# Patient Record
Sex: FEMALE | Race: WHITE | NOT HISPANIC OR LATINO | Employment: FULL TIME | URBAN - METROPOLITAN AREA
[De-identification: names, ages, dates, MRNs, and addresses within clinical notes are randomized per-mention and may not be internally consistent; named-entity substitution may affect disease eponyms.]

---

## 2023-05-04 ENCOUNTER — TELEPHONE (OUTPATIENT)
Dept: PRIMARY CARE | Facility: CLINIC | Age: 33
End: 2023-05-04

## 2023-05-04 ENCOUNTER — OFFICE VISIT (OUTPATIENT)
Dept: PRIMARY CARE | Facility: CLINIC | Age: 33
End: 2023-05-04
Payer: COMMERCIAL

## 2023-05-04 ENCOUNTER — LAB (OUTPATIENT)
Dept: LAB | Facility: LAB | Age: 33
End: 2023-05-04
Payer: COMMERCIAL

## 2023-05-04 VITALS
BODY MASS INDEX: 34.43 KG/M2 | HEART RATE: 99 BPM | OXYGEN SATURATION: 97 % | HEIGHT: 68 IN | SYSTOLIC BLOOD PRESSURE: 125 MMHG | WEIGHT: 227.2 LBS | DIASTOLIC BLOOD PRESSURE: 87 MMHG

## 2023-05-04 DIAGNOSIS — R10.11 RIGHT UPPER QUADRANT ABDOMINAL PAIN: Primary | ICD-10-CM

## 2023-05-04 DIAGNOSIS — R10.11 RIGHT UPPER QUADRANT ABDOMINAL PAIN: ICD-10-CM

## 2023-05-04 DIAGNOSIS — R11.2 NAUSEA AND VOMITING, UNSPECIFIED VOMITING TYPE: Primary | ICD-10-CM

## 2023-05-04 DIAGNOSIS — Z00.00 ANNUAL PHYSICAL EXAM: ICD-10-CM

## 2023-05-04 DIAGNOSIS — R11.2 NAUSEA AND VOMITING, UNSPECIFIED VOMITING TYPE: ICD-10-CM

## 2023-05-04 PROCEDURE — 99213 OFFICE O/P EST LOW 20 MIN: CPT | Performed by: INTERNAL MEDICINE

## 2023-05-04 PROCEDURE — 84443 ASSAY THYROID STIM HORMONE: CPT

## 2023-05-04 PROCEDURE — 83690 ASSAY OF LIPASE: CPT

## 2023-05-04 PROCEDURE — 82150 ASSAY OF AMYLASE: CPT

## 2023-05-04 PROCEDURE — 80053 COMPREHEN METABOLIC PANEL: CPT

## 2023-05-04 PROCEDURE — 36415 COLL VENOUS BLD VENIPUNCTURE: CPT

## 2023-05-04 PROCEDURE — 85027 COMPLETE CBC AUTOMATED: CPT

## 2023-05-04 PROCEDURE — 82652 VIT D 1 25-DIHYDROXY: CPT

## 2023-05-04 PROCEDURE — 80061 LIPID PANEL: CPT

## 2023-05-04 RX ORDER — DICYCLOMINE HYDROCHLORIDE 10 MG/1
10 CAPSULE ORAL 4 TIMES DAILY PRN
Qty: 30 CAPSULE | Refills: 0 | Status: SHIPPED | OUTPATIENT
Start: 2023-05-04 | End: 2023-07-05 | Stop reason: SDUPTHER

## 2023-05-04 ASSESSMENT — ENCOUNTER SYMPTOMS
MYALGIAS: 0
ABDOMINAL PAIN: 1
PALPITATIONS: 0
NAUSEA: 1
LIGHT-HEADEDNESS: 0
CONSTIPATION: 0
HEADACHES: 0
FATIGUE: 0
VOMITING: 1
DYSURIA: 0
FEVER: 0
DIFFICULTY URINATING: 0
DIARRHEA: 0
ARTHRALGIAS: 0
COUGH: 0
WEAKNESS: 0
SORE THROAT: 0
SHORTNESS OF BREATH: 0
HEMATURIA: 0
CHILLS: 1
FREQUENCY: 0
DIZZINESS: 0

## 2023-05-04 ASSESSMENT — PAIN SCALES - GENERAL: PAINLEVEL: 4

## 2023-05-04 ASSESSMENT — PATIENT HEALTH QUESTIONNAIRE - PHQ9
2. FEELING DOWN, DEPRESSED OR HOPELESS: NOT AT ALL
SUM OF ALL RESPONSES TO PHQ9 QUESTIONS 1 AND 2: 0
1. LITTLE INTEREST OR PLEASURE IN DOING THINGS: NOT AT ALL

## 2023-05-04 NOTE — TELEPHONE ENCOUNTER
Patient states her galbladder scan is for May 19th in [place]    Patient states she is in so much pain and she don't think she can make it until then. She want to know if the order can be place as stat and she also want to know if she can have something for nausea because she said when she eat she vomit a hr or 2 afterwards.     I spoke with Cesar and her Bentyl will be ready in 30 mins  BN

## 2023-05-04 NOTE — LETTER
May 4, 2023     Patient: Karina Sanchez   YOB: 1990   Date of Visit: 5/4/2023       To Whom It May Concern:    Karina Sanchez was seen in my clinic on 5/4/2023 at 10:00 am. Please excuse Karina for her absence from work on this day to make the appointment.    If you have any questions or concerns, please don't hesitate to call.         Sincerely,         Lori Abdi MD        CC: No Recipients

## 2023-05-04 NOTE — TELEPHONE ENCOUNTER
I called and spoke to the patient.  She is clearly in some distress and worried about not being able to eat or drink.  At this time I encouraged her to go on a liquid diet and push things like ginger ale and Gatorade and broth tonight.  If she is doing well then tomorrow she could have a dry piece of toast with breakfast and to slowly increase her diet from there.  I encouraged her to take the Bentyl that was called in and to use Prilosec 20 mg daily over-the-counter.  Patient will try these measures was reassured.  If symptoms are consistently causing her abdominal bloating bowel nausea and vomiting I would next check a CT of the abdomen to make sure she does not have a bowel obstruction.  This seems unlikely but is always possible.  She is still having normal bowel movements and had no lower abdominal pain to palpation today and normal bowel sounds when I examined her.

## 2023-05-04 NOTE — PROGRESS NOTES
Subjective   Patient ID: Karina Sanchez is a 32 y.o. female who presents for stomach pain above her navel and have back pain. She also states she been vomiting. This been going on for the past two days.  BN    Patient is here to establish with new primary care physician she has not seen anybody with her OB/GYN in several years.  Patient is here because on this past Monday night she started getting severe stomach and/or abdominal pains.  Began on Monday evening and extended into Tuesday morning with severe right upper quadrant abdominal pain nausea and vomiting.  By Tuesday night the pain was severe again in her extending into the right back just below the scapula on the right side.  She also had chills but no fever and symptoms are directly correlated to when she eats.  When she eats the symptoms get much worse and that if she stops eating they seem to slowly improve.        Review of Systems   Constitutional:  Positive for chills. Negative for fatigue and fever.   HENT:  Negative for sore throat.    Eyes:  Negative for visual disturbance.   Respiratory:  Negative for cough and shortness of breath.    Cardiovascular:  Negative for chest pain, palpitations and leg swelling.   Gastrointestinal:  Positive for abdominal pain, nausea and vomiting. Negative for constipation and diarrhea.   Genitourinary:  Negative for difficulty urinating, dysuria, frequency, hematuria and urgency.   Musculoskeletal:  Negative for arthralgias and myalgias.   Skin:  Negative for rash.   Neurological:  Negative for dizziness, syncope, weakness, light-headedness and headaches.       Objective   Medication Documentation Review Audit       Reviewed by Lori Abdi MD (Physician) on 05/04/23 at 1018      Medication Order Taking? Sig Documenting Provider Last Dose Status            No Medications to Display                                 Physical Exam  Constitutional:       Appearance: Normal appearance.   HENT:      Head: Normocephalic and  "atraumatic.      Nose: Nose normal.   Eyes:      Extraocular Movements: Extraocular movements intact.      Pupils: Pupils are equal, round, and reactive to light.   Cardiovascular:      Rate and Rhythm: Normal rate and regular rhythm.   Pulmonary:      Breath sounds: Normal breath sounds.   Abdominal:      General: Abdomen is flat. Bowel sounds are normal.      Palpations: Abdomen is soft.      Tenderness: There is abdominal tenderness. There is guarding.   Musculoskeletal:      Right lower leg: No edema.      Left lower leg: No edema.   Neurological:      Mental Status: She is alert.     /87 (BP Location: Left arm, Patient Position: Sitting)   Pulse 99   Ht 1.727 m (5' 8\")   Wt 103 kg (227 lb 3.2 oz)   SpO2 97%   BMI 34.55 kg/m²       Assessment/Plan   Problem List Items Addressed This Visit          Nervous    Right upper quadrant abdominal pain - Primary    Relevant Orders    US gallbladder     Other Visit Diagnoses       Annual physical exam        Relevant Orders    Lipid Panel    CBC    Comprehensive Metabolic Panel    TSH with reflex to Free T4 if abnormal    Vitamin D 1,25 Dihydroxy                   It has been a pleasure seeing you.   "

## 2023-05-05 LAB
ALANINE AMINOTRANSFERASE (SGPT) (U/L) IN SER/PLAS: 28 U/L (ref 7–45)
ALBUMIN (G/DL) IN SER/PLAS: 4.8 G/DL (ref 3.4–5)
ALKALINE PHOSPHATASE (U/L) IN SER/PLAS: 44 U/L (ref 33–110)
AMYLASE (U/L) IN SER/PLAS: 30 U/L (ref 29–103)
ANION GAP IN SER/PLAS: 15 MMOL/L (ref 10–20)
ASPARTATE AMINOTRANSFERASE (SGOT) (U/L) IN SER/PLAS: 18 U/L (ref 9–39)
BILIRUBIN TOTAL (MG/DL) IN SER/PLAS: 0.5 MG/DL (ref 0–1.2)
CALCIUM (MG/DL) IN SER/PLAS: 10 MG/DL (ref 8.6–10.6)
CARBON DIOXIDE, TOTAL (MMOL/L) IN SER/PLAS: 26 MMOL/L (ref 21–32)
CHLORIDE (MMOL/L) IN SER/PLAS: 103 MMOL/L (ref 98–107)
CHOLESTEROL (MG/DL) IN SER/PLAS: 222 MG/DL (ref 0–199)
CHOLESTEROL IN HDL (MG/DL) IN SER/PLAS: 37.6 MG/DL
CHOLESTEROL/HDL RATIO: 5.9
CREATININE (MG/DL) IN SER/PLAS: 0.94 MG/DL (ref 0.5–1.05)
ERYTHROCYTE DISTRIBUTION WIDTH (RATIO) BY AUTOMATED COUNT: 12.6 % (ref 11.5–14.5)
ERYTHROCYTE MEAN CORPUSCULAR HEMOGLOBIN CONCENTRATION (G/DL) BY AUTOMATED: 34 G/DL (ref 32–36)
ERYTHROCYTE MEAN CORPUSCULAR VOLUME (FL) BY AUTOMATED COUNT: 93 FL (ref 80–100)
ERYTHROCYTES (10*6/UL) IN BLOOD BY AUTOMATED COUNT: 4.89 X10E12/L (ref 4–5.2)
GFR FEMALE: 82 ML/MIN/1.73M2
GLUCOSE (MG/DL) IN SER/PLAS: 104 MG/DL (ref 74–99)
HEMATOCRIT (%) IN BLOOD BY AUTOMATED COUNT: 45.3 % (ref 36–46)
HEMOGLOBIN (G/DL) IN BLOOD: 15.4 G/DL (ref 12–16)
LDL: 133 MG/DL (ref 0–99)
LEUKOCYTES (10*3/UL) IN BLOOD BY AUTOMATED COUNT: 9.4 X10E9/L (ref 4.4–11.3)
LIPASE (U/L) IN SER/PLAS: 16 U/L (ref 9–82)
NON HDL CHOLESTEROL: 184 MG/DL
NRBC (PER 100 WBCS) BY AUTOMATED COUNT: 0 /100 WBC (ref 0–0)
PLATELETS (10*3/UL) IN BLOOD AUTOMATED COUNT: 246 X10E9/L (ref 150–450)
POTASSIUM (MMOL/L) IN SER/PLAS: 3.8 MMOL/L (ref 3.5–5.3)
PROTEIN TOTAL: 7.4 G/DL (ref 6.4–8.2)
SODIUM (MMOL/L) IN SER/PLAS: 140 MMOL/L (ref 136–145)
THYROTROPIN (MIU/L) IN SER/PLAS BY DETECTION LIMIT <= 0.05 MIU/L: 1.19 MIU/L (ref 0.44–3.98)
TRIGLYCERIDE (MG/DL) IN SER/PLAS: 257 MG/DL (ref 0–149)
UREA NITROGEN (MG/DL) IN SER/PLAS: 11 MG/DL (ref 6–23)
VLDL: 51 MG/DL (ref 0–40)

## 2023-05-08 LAB — VITAMIN D 1,25-DIHYDROXY: 53 PG/ML (ref 19.9–79.3)

## 2023-05-08 NOTE — RESULT ENCOUNTER NOTE
Please notify patient she has a mild elevation in her cholesterol.  LDL should be less than 100 is 133.  Also triglycerides should be less than 150 and hers are 257.  She needs to work on a low-fat and low carbohydrate diet.  All her other testing was okay including her amylase and lipase.

## 2023-05-26 ENCOUNTER — TELEPHONE (OUTPATIENT)
Dept: PRIMARY CARE | Facility: CLINIC | Age: 33
End: 2023-05-26
Payer: COMMERCIAL

## 2023-05-26 NOTE — TELEPHONE ENCOUNTER
Patient notified and read message. Patient states the pain is not as bad as it was before. She said it is just uncomfortable but not consistent. She also said she chaged her diet.  BN    ----- Message from Lori Abdi MD sent at 5/26/2023  7:39 AM EDT -----  Please notify patient her gallbladder function test was normal.  The ducts are open and the gallbladder is working appropriately.  If she still having the pains?

## 2023-05-26 NOTE — RESULT ENCOUNTER NOTE
Please call patient and tell her that the ultrasound did not show gallstones or any abnormalities in the gallbladder.  I would like her to go back and get 2 more labs called lipase and amylase, orders are in epic  I would also like her to schedule for a nuclear scan of her gallbladder.  Orders are in the computer.  Finally I need to know what local pharmacy she is using and I will send in a prescription called Bentyl which may actually help with some of her symptoms.
Please notify patient her gallbladder function test was normal.  The ducts are open and the gallbladder is working appropriately.  If she still having the pains?
Yes

## 2023-06-13 ENCOUNTER — OFFICE VISIT (OUTPATIENT)
Dept: PRIMARY CARE | Facility: CLINIC | Age: 33
End: 2023-06-13
Payer: COMMERCIAL

## 2023-06-13 ENCOUNTER — APPOINTMENT (OUTPATIENT)
Dept: PRIMARY CARE | Facility: CLINIC | Age: 33
End: 2023-06-13
Payer: COMMERCIAL

## 2023-06-13 VITALS
HEART RATE: 105 BPM | SYSTOLIC BLOOD PRESSURE: 98 MMHG | DIASTOLIC BLOOD PRESSURE: 70 MMHG | BODY MASS INDEX: 35.44 KG/M2 | OXYGEN SATURATION: 97 % | HEIGHT: 67 IN | WEIGHT: 225.8 LBS

## 2023-06-13 DIAGNOSIS — F17.210 CIGARETTE NICOTINE DEPENDENCE WITHOUT COMPLICATION: Primary | ICD-10-CM

## 2023-06-13 DIAGNOSIS — R10.11 RIGHT UPPER QUADRANT ABDOMINAL PAIN: ICD-10-CM

## 2023-06-13 PROBLEM — E78.00 HYPERCHOLESTEROLEMIA: Status: ACTIVE | Noted: 2023-06-13

## 2023-06-13 PROCEDURE — 99214 OFFICE O/P EST MOD 30 MIN: CPT | Performed by: INTERNAL MEDICINE

## 2023-06-13 ASSESSMENT — ENCOUNTER SYMPTOMS
FEVER: 0
FATIGUE: 0
DYSURIA: 0
SORE THROAT: 0
DIARRHEA: 0
HEADACHES: 0
NAUSEA: 0
LIGHT-HEADEDNESS: 0
SHORTNESS OF BREATH: 0
VOMITING: 0
COUGH: 0
HEMATURIA: 0
PALPITATIONS: 0
CONSTIPATION: 0
FREQUENCY: 0
DIZZINESS: 0
CHILLS: 0
MYALGIAS: 0
ARTHRALGIAS: 0
WEAKNESS: 0
DIFFICULTY URINATING: 0

## 2023-06-13 ASSESSMENT — PATIENT HEALTH QUESTIONNAIRE - PHQ9
2. FEELING DOWN, DEPRESSED OR HOPELESS: NOT AT ALL
1. LITTLE INTEREST OR PLEASURE IN DOING THINGS: NOT AT ALL
SUM OF ALL RESPONSES TO PHQ9 QUESTIONS 1 AND 2: 0

## 2023-06-13 ASSESSMENT — PAIN SCALES - GENERAL: PAINLEVEL: 0-NO PAIN

## 2023-06-13 NOTE — PROGRESS NOTES
Subjective   Patient ID: Karina Sanchez is a 32 y.o. female who presents for a follow-up general health management.  BN    Here for follow-up on her GI issues.  Ultrasound of the gallbladder was negative as was HIDA scan.  Patient has had multiple changes in her environment including only eating at home and rarely eating out, reduced fats and meats in her diet eating a lot more cooked vegetables and her symptoms have slowly been improving.  She also has decided she needs to lose weight and she needs to quit smoking.  We talked about how to quit in offered her medications or nicotine replacement as options which she prefers to go cold turkey.  I also gave her the 0 084 quit NOW helpline as a reference in case she needed help or to talk to somebody else about options.        Review of Systems   Constitutional:  Negative for chills, fatigue and fever.   HENT:  Negative for sore throat.    Eyes:  Negative for visual disturbance.   Respiratory:  Negative for cough and shortness of breath.    Cardiovascular:  Negative for chest pain, palpitations and leg swelling.   Gastrointestinal:  Negative for constipation, diarrhea, nausea and vomiting.        Much of the abdominal pain and distention of the right upper quadrant have improved with dietary changes including low-fat, not eating out and increased greens or vegetables in general.   Genitourinary:  Negative for difficulty urinating, dysuria, frequency, hematuria and urgency.   Musculoskeletal:  Negative for arthralgias and myalgias.   Skin:  Negative for rash.   Neurological:  Negative for dizziness, syncope, weakness, light-headedness and headaches.       Objective   Medication Documentation Review Audit       Reviewed by Lori Abdi MD (Physician) on 06/13/23 at 0929      Medication Order Taking? Sig Documenting Provider Last Dose Status   dicyclomine (Bentyl) 10 mg capsule [id number]  Take 1 capsule (10 mg) by mouth 4 times a day as needed (abdominal pain or cramps).  "Lori Abdi MD  Active                  Physical Exam  Constitutional:       Appearance: Normal appearance.   HENT:      Head: Normocephalic and atraumatic.      Nose: Nose normal.   Eyes:      Extraocular Movements: Extraocular movements intact.      Pupils: Pupils are equal, round, and reactive to light.   Cardiovascular:      Rate and Rhythm: Normal rate and regular rhythm.   Pulmonary:      Breath sounds: Normal breath sounds.   Abdominal:      General: Abdomen is flat. Bowel sounds are normal.      Palpations: Abdomen is soft.   Musculoskeletal:      Right lower leg: No edema.      Left lower leg: No edema.   Neurological:      Mental Status: She is alert.     BP 98/70 (BP Location: Left arm, Patient Position: Sitting)   Pulse 105   Ht 1.702 m (5' 7\")   Wt 102 kg (225 lb 12.8 oz)   SpO2 97%   BMI 35.37 kg/m²       Assessment/Plan   Problem List Items Addressed This Visit       Right upper quadrant abdominal pain     Aminal pain and bloating have all improved with dietary changes.  For now she will continue to make those changes and call if symptoms become a recurrent         Dependence on nicotine from cigarettes - Primary     Has cigarette dependence and although she has cut back plans on working on total cessation within the next few weeks.  She was offered several references and help to follow-up for this.  She notes that she thinks she can do this cold turkey she has the support of her .                   It has been a pleasure seeing you.   "

## 2023-06-13 NOTE — ASSESSMENT & PLAN NOTE
Aminal pain and bloating have all improved with dietary changes.  For now she will continue to make those changes and call if symptoms become a recurrent

## 2023-06-13 NOTE — ASSESSMENT & PLAN NOTE
Annual labs were reviewed with the patient and her LDL cholesterol is elevated 133.  We talked about a low-fat as well as a low carbohydrate diet and how she needs to start exercising.  Patient states understanding will work on the exercise and diet and regular follow-up with me will be in 1 year or as needed

## 2023-06-13 NOTE — PATIENT INSTRUCTIONS
1-800 quit Now      Use metamucil daily for fiber    Follow up Dr Abdi in 1 year for annul physical

## 2023-06-14 ENCOUNTER — APPOINTMENT (OUTPATIENT)
Dept: PRIMARY CARE | Facility: CLINIC | Age: 33
End: 2023-06-14
Payer: COMMERCIAL

## 2023-07-03 ENCOUNTER — TELEPHONE (OUTPATIENT)
Dept: PRIMARY CARE | Facility: CLINIC | Age: 33
End: 2023-07-03
Payer: COMMERCIAL

## 2023-07-03 NOTE — TELEPHONE ENCOUNTER
Pt aware of recommendations by Dr Aaron to go to ER/Urgent care for pain and diarrhea. Will send Dr Abdi a msg about the blood in stool-pt already sent a message about that.

## 2023-07-03 NOTE — TELEPHONE ENCOUNTER
Pt called and stated she is experiencing her stomach pains again that began on Thursday. Thursday she ate and got sick and same thing on Friday. Yesterday she only ate ham and eggs and she got sick. She has diarrhea with blood last night between 11-1 am. She would like to be seen.  Pt # [phone]

## 2023-07-05 DIAGNOSIS — R10.11 RIGHT UPPER QUADRANT ABDOMINAL PAIN: ICD-10-CM

## 2023-07-05 RX ORDER — DICYCLOMINE HYDROCHLORIDE 10 MG/1
10 CAPSULE ORAL 4 TIMES DAILY PRN
Qty: 30 CAPSULE | Refills: 0 | OUTPATIENT
Start: 2023-07-05 | End: 2023-09-03

## 2023-07-05 RX ORDER — DICYCLOMINE HYDROCHLORIDE 10 MG/1
10 CAPSULE ORAL 4 TIMES DAILY PRN
Qty: 30 CAPSULE | Refills: 0 | Status: SHIPPED | OUTPATIENT
Start: 2023-07-05 | End: 2023-09-03

## 2023-07-05 NOTE — TELEPHONE ENCOUNTER
This message is from pt's task    RX for:    Dicyclomine 10 mg    1 cap 4 times a day as needed      Cesar Wright    BN

## 2023-08-14 ENCOUNTER — TELEPHONE (OUTPATIENT)
Dept: PRIMARY CARE | Facility: CLINIC | Age: 33
End: 2023-08-14
Payer: COMMERCIAL

## 2023-08-14 NOTE — TELEPHONE ENCOUNTER
Called back.   Pt has called in and has tested positive for Covid 8/13/23. This was a home test. Symptoms started 8/13/23.   First time having covid? 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 no  Fever/chills/shaking? no  Sore throat/hoarseness? yes  Cough/sputum/color? Dry cough-not consistent  SOB/wheezing? no  Nasal congestion? no  Headache? yes  Loss of sense of smell/taste? no  Body aches/malaise? Malaise  D/N/V? Does have diarrhea-no vomiting.     Pt would not want Paxlovid-just wanted to report to you. Pt was given instructions on when to go to ER, to rest a lot, drink plenty of fluids and take Tylenol as needed. She will call us if anything else arises/going to ER.  ANO?

## 2024-06-04 ENCOUNTER — APPOINTMENT (OUTPATIENT)
Dept: PRIMARY CARE | Facility: CLINIC | Age: 34
End: 2024-06-04
Payer: COMMERCIAL

## 2024-07-08 ENCOUNTER — OFFICE VISIT (OUTPATIENT)
Dept: PRIMARY CARE | Facility: CLINIC | Age: 34
End: 2024-07-08
Payer: COMMERCIAL

## 2024-07-08 VITALS
OXYGEN SATURATION: 96 % | HEIGHT: 67 IN | HEART RATE: 108 BPM | BODY MASS INDEX: 36.1 KG/M2 | WEIGHT: 230 LBS | DIASTOLIC BLOOD PRESSURE: 83 MMHG | SYSTOLIC BLOOD PRESSURE: 129 MMHG

## 2024-07-08 DIAGNOSIS — R10.13 EPIGASTRIC PAIN: ICD-10-CM

## 2024-07-08 DIAGNOSIS — K29.70 GASTRITIS WITHOUT BLEEDING, UNSPECIFIED CHRONICITY, UNSPECIFIED GASTRITIS TYPE: Primary | ICD-10-CM

## 2024-07-08 DIAGNOSIS — T75.3XXA SEA SICKNESS, INITIAL ENCOUNTER: ICD-10-CM

## 2024-07-08 PROCEDURE — 99213 OFFICE O/P EST LOW 20 MIN: CPT | Performed by: INTERNAL MEDICINE

## 2024-07-08 RX ORDER — OMEPRAZOLE 40 MG/1
40 CAPSULE, DELAYED RELEASE ORAL
Qty: 30 CAPSULE | Refills: 3 | Status: SHIPPED | OUTPATIENT
Start: 2024-07-08

## 2024-07-08 RX ORDER — SCOLOPAMINE TRANSDERMAL SYSTEM 1 MG/1
1 PATCH, EXTENDED RELEASE TRANSDERMAL
Qty: 2 PATCH | Refills: 0 | Status: SHIPPED | OUTPATIENT
Start: 2024-07-08 | End: 2024-09-06

## 2024-07-08 RX ORDER — ONDANSETRON 4 MG/1
4 TABLET, ORALLY DISINTEGRATING ORAL EVERY 6 HOURS PRN
COMMUNITY
Start: 2024-07-05 | End: 2024-07-12

## 2024-07-08 RX ORDER — HYOSCYAMINE SULFATE 0.125 MG
0.12 TABLET ORAL EVERY 8 HOURS PRN
COMMUNITY
Start: 2024-07-05 | End: 2024-07-12

## 2024-07-08 ASSESSMENT — ENCOUNTER SYMPTOMS
ARTHRALGIAS: 0
ABDOMINAL PAIN: 1
FATIGUE: 0
CONSTIPATION: 0
TROUBLE SWALLOWING: 0
PALPITATIONS: 0
FREQUENCY: 0
FEVER: 0
DIZZINESS: 0
LIGHT-HEADEDNESS: 0
NAUSEA: 0
COUGH: 0
DIARRHEA: 0
ABDOMINAL DISTENTION: 1
SHORTNESS OF BREATH: 0
VOMITING: 0
DYSURIA: 0
SORE THROAT: 0

## 2024-07-08 ASSESSMENT — PAIN SCALES - GENERAL: PAINLEVEL: 0-NO PAIN

## 2024-07-08 ASSESSMENT — PATIENT HEALTH QUESTIONNAIRE - PHQ9
SUM OF ALL RESPONSES TO PHQ9 QUESTIONS 1 AND 2: 0
2. FEELING DOWN, DEPRESSED OR HOPELESS: NOT AT ALL
1. LITTLE INTEREST OR PLEASURE IN DOING THINGS: NOT AT ALL

## 2024-07-08 NOTE — PROGRESS NOTES
Subjective   Patient ID: Karina Sanchez is a 33 y.o. female who presents for IBS issues.    Patient is here today to follow-up from ER visit July 5.  The weekend before the patient was at a wedding and did have some excessive drinking but it did not seem to bother her at that time.  Monday through Thursday she was perfectly fine but on Friday morning when she went to work she was running late.  She ate part of a bagel but never finished it and did not have her normal breakfast or oatmeal.  When she got to work she started getting what she calls abdominal pain and thought she was having an IBS flareup.  This was not her typical flareup because all of the pain was centered and she points to the epigastric area.  It was also described more like a heartburn which she could not get under control as to her normal pain which is typically more crampy.  Patient took several of the Levsin at least 3 she says possibly for as well as multiple times but because the pain was so severe she had to have a work colleague drive her to the ER.  Once in the ER they worked her up did not find anything and sent her home on the hyoscyamine and Zofran.  While in the ER she was given a GI cocktail and something in the IV but she is not sure what it was.  She does describe drinking a special liquid that made her entire mouth and throat numb.  At the same time she drink the liquid she was given something in the IV and within 15 to 20 minutes she was feeling better but is not sure what improved her symptoms.  She denies any change in bowels and no black and tarry stools.          Review of Systems   Constitutional:  Negative for fatigue and fever.   HENT:  Negative for sore throat and trouble swallowing.    Eyes:  Negative for visual disturbance.   Respiratory:  Negative for cough and shortness of breath.    Cardiovascular:  Negative for chest pain, palpitations and leg swelling.   Gastrointestinal:  Positive for abdominal distention and  "abdominal pain. Negative for constipation, diarrhea, nausea and vomiting.        See chief complaint and history of present illness.  Since leaving the emergency room the patient has not had any more abdominal pain or cramping or epigastric pain.   Genitourinary:  Negative for dysuria and frequency.   Musculoskeletal:  Negative for arthralgias.   Skin:  Negative for rash.   Neurological:  Negative for dizziness and light-headedness.       Objective   Medication Documentation Review Audit       Reviewed by Lori Abdi MD (Physician) on 07/08/24 at 1612      Medication Order Taking? Sig Documenting Provider Last Dose Status      Discontinued 07/08/24 1120   hyoscyamine (Anaspaz, Levsin) 0.125 mg tablet [id number] Yes Take 1 tablet (0.125 mg) by mouth every 8 hours if needed for diarrhea. Historical Provider, MD  Active   ondansetron ODT (Zofran-ODT) 4 mg disintegrating tablet [id number] Yes Take 1 tablet (4 mg) by mouth every 6 hours if needed. Historical Provider, MD  Active                  No Known Allergies  Physical Exam  Constitutional:       Appearance: Normal appearance.   HENT:      Head: Normocephalic and atraumatic.      Nose: Nose normal.   Eyes:      Extraocular Movements: Extraocular movements intact.      Pupils: Pupils are equal, round, and reactive to light.   Cardiovascular:      Rate and Rhythm: Normal rate and regular rhythm.   Pulmonary:      Breath sounds: Normal breath sounds.   Abdominal:      General: Abdomen is flat. Bowel sounds are normal.      Palpations: Abdomen is soft.   Musculoskeletal:      Right lower leg: No edema.      Left lower leg: No edema.   Neurological:      Mental Status: She is alert.     /83 (BP Location: Left leg)   Pulse 108   Ht 1.702 m (5' 7\")   Wt 104 kg (230 lb)   SpO2 96%   BMI 36.02 kg/m²       Assessment/Plan   Problem List Items Addressed This Visit       Gastritis - Primary    Relevant Medications    omeprazole (PriLOSEC) 40 mg DR capsule    " Epigastric pain     Patient has been treated for irritable bowel syndrome with hyoscyamine and she has been taking an acids.  This episode was different than her previous attacks however as it was located in the epigastrium and she described it as a feeling like she had uncontrolled heartburn that she could not get under control despite repeated antiacids.  Even with a GI cocktail in the ER or something they gave her in the IV improved her symptoms but she not sure which.    At this time I have recommended the patient continue her proton pump inhibitor omeprazole 40 mg daily and in addition to this I have recommended switching regular antiacid over to Gaviscon extra strength tablets.  I would like her on both of these for at least 6 weeks at which time I will follow-up with her and see if she has any more recurrences.          Other Visit Diagnoses       Sea sickness, initial encounter        Relevant Medications    scopolamine (Transderm-Scop) 1 mg over 3 days patch 3 day                   It has been a pleasure seeing you.  Lori Abdi MD

## 2024-07-08 NOTE — ASSESSMENT & PLAN NOTE
Patient has been treated for irritable bowel syndrome with hyoscyamine and she has been taking an acids.  This episode was different than her previous attacks however as it was located in the epigastrium and she described it as a feeling like she had uncontrolled heartburn that she could not get under control despite repeated antiacids.  Even with a GI cocktail in the ER or something they gave her in the IV improved her symptoms but she not sure which.    At this time I have recommended the patient continue her proton pump inhibitor omeprazole 40 mg daily and in addition to this I have recommended switching regular antiacid over to Gaviscon extra strength tablets.  I would like her on both of these for at least 6 weeks at which time I will follow-up with her and see if she has any more recurrences.

## 2024-07-08 NOTE — PATIENT INSTRUCTIONS
Get Gaviscon extra strength antacid , foam tabs    Follow up Dr Abdi in 2 months for check on GERD

## 2024-09-09 ENCOUNTER — APPOINTMENT (OUTPATIENT)
Dept: PRIMARY CARE | Facility: CLINIC | Age: 34
End: 2024-09-09
Payer: COMMERCIAL

## 2024-10-10 ENCOUNTER — APPOINTMENT (OUTPATIENT)
Dept: PRIMARY CARE | Facility: CLINIC | Age: 34
End: 2024-10-10
Payer: COMMERCIAL

## 2024-10-10 VITALS
BODY MASS INDEX: 36.6 KG/M2 | WEIGHT: 233.2 LBS | HEART RATE: 90 BPM | HEIGHT: 67 IN | SYSTOLIC BLOOD PRESSURE: 110 MMHG | DIASTOLIC BLOOD PRESSURE: 76 MMHG | OXYGEN SATURATION: 97 % | RESPIRATION RATE: 16 BRPM

## 2024-10-10 DIAGNOSIS — F17.210 CIGARETTE NICOTINE DEPENDENCE WITHOUT COMPLICATION: ICD-10-CM

## 2024-10-10 DIAGNOSIS — K58.9 IRRITABLE BOWEL SYNDROME, UNSPECIFIED TYPE: ICD-10-CM

## 2024-10-10 DIAGNOSIS — E78.00 HYPERCHOLESTEROLEMIA: ICD-10-CM

## 2024-10-10 DIAGNOSIS — K29.70 GASTRITIS WITHOUT BLEEDING, UNSPECIFIED CHRONICITY, UNSPECIFIED GASTRITIS TYPE: ICD-10-CM

## 2024-10-10 DIAGNOSIS — R10.13 EPIGASTRIC PAIN: ICD-10-CM

## 2024-10-10 DIAGNOSIS — Z00.00 ANNUAL PHYSICAL EXAM: Primary | ICD-10-CM

## 2024-10-10 PROCEDURE — 99214 OFFICE O/P EST MOD 30 MIN: CPT | Performed by: INTERNAL MEDICINE

## 2024-10-10 PROCEDURE — 3008F BODY MASS INDEX DOCD: CPT | Performed by: INTERNAL MEDICINE

## 2024-10-10 RX ORDER — HYOSCYAMINE SULFATE 0.125 MG
0.12 TABLET ORAL EVERY 8 HOURS PRN
Qty: 90 TABLET | Refills: 3 | Status: SHIPPED | OUTPATIENT
Start: 2024-10-10

## 2024-10-10 RX ORDER — OMEPRAZOLE 40 MG/1
40 CAPSULE, DELAYED RELEASE ORAL
Qty: 90 CAPSULE | Refills: 3 | Status: SHIPPED | OUTPATIENT
Start: 2024-10-10

## 2024-10-10 ASSESSMENT — ENCOUNTER SYMPTOMS
SORE THROAT: 0
CONSTIPATION: 0
FEVER: 0
ARTHRALGIAS: 0
DYSURIA: 0
TROUBLE SWALLOWING: 0
FATIGUE: 0
PALPITATIONS: 0
ABDOMINAL PAIN: 0
VOMITING: 0
SHORTNESS OF BREATH: 0
DIARRHEA: 0
NAUSEA: 0
FREQUENCY: 0
DIZZINESS: 0
LIGHT-HEADEDNESS: 0
COUGH: 0

## 2024-10-10 ASSESSMENT — PAIN SCALES - GENERAL: PAINLEVEL: 0-NO PAIN

## 2024-10-10 NOTE — ASSESSMENT & PLAN NOTE
Smoking cessation was briefly discussed with the patient today and she makes it clear that at this time she is just not ready to quit.

## 2024-10-10 NOTE — ASSESSMENT & PLAN NOTE
Gastritis and epigastric pain have all stop now that she is on omeprazole 40 mg daily and Levsin.  She will continue on these drugs for the next 6 months until I see her back at which time we will try to start weaning them.

## 2024-10-10 NOTE — PATIENT INSTRUCTIONS
Follow up Dr Abdi in 6 months  for 30 min annual physical    Get fasting labs before next appointment

## 2024-10-10 NOTE — PROGRESS NOTES
"Subjective   Patient ID: Karina Sanchez is a 34 y.o. female who presents for No chief complaint on file..  Patient is here for follow-up on GI issues.  Now that she is taking the Levsin daily as well as omeprazole she no longer has any GI cramping or issues and is finally feeling back to her baseline.  She is taking the Levsin on a daily basis and is significantly helping her cramping        Review of Systems   Constitutional:  Negative for fatigue and fever.   HENT:  Negative for sore throat and trouble swallowing.    Eyes:  Negative for visual disturbance.   Respiratory:  Negative for cough and shortness of breath.    Cardiovascular:  Negative for chest pain, palpitations and leg swelling.   Gastrointestinal:  Negative for abdominal pain, constipation, diarrhea, nausea and vomiting.   Genitourinary:  Negative for dysuria and frequency.   Musculoskeletal:  Negative for arthralgias.   Skin:  Negative for rash.   Neurological:  Negative for dizziness and light-headedness.       Objective   Medication Documentation Review Audit       Reviewed by Lori Abdi MD (Physician) on 10/10/24 at 1540      Medication Order Taking? Sig Documenting Provider Last Dose Status   hyoscyamine (Anaspaz, Levsin) 0.125 mg tablet [id number]  Take 1 tablet (0.125 mg) by mouth every 8 hours if needed for diarrhea. Historical Provider, MD  Active   omeprazole (PriLOSEC) 40 mg DR capsule [id number]  Take 1 capsule (40 mg) by mouth once daily in the morning. Take before meals. Do not crush or chew. Lori Abdi MD  Active                  No Known Allergies    /76   Pulse 90   Resp 16   Ht 1.702 m (5' 7\")   Wt 106 kg (233 lb 3.2 oz)   SpO2 97%   BMI 36.52 kg/m²     Physical Exam  Constitutional:       Appearance: Normal appearance.   HENT:      Head: Normocephalic and atraumatic.      Nose: Nose normal.   Eyes:      Extraocular Movements: Extraocular movements intact.      Pupils: Pupils are equal, round, and reactive to light. "   Cardiovascular:      Rate and Rhythm: Normal rate and regular rhythm.   Pulmonary:      Breath sounds: Normal breath sounds.   Abdominal:      General: Abdomen is flat. Bowel sounds are normal.      Palpations: Abdomen is soft.   Musculoskeletal:      Right lower leg: No edema.      Left lower leg: No edema.   Neurological:      Mental Status: She is alert.           Assessment/Plan   Problem List Items Addressed This Visit       Dependence on nicotine from cigarettes     Smoking cessation was briefly discussed with the patient today and she makes it clear that at this time she is just not ready to quit.         Hypercholesterolemia     Lipid profile will be checked before her next appointment in 6 months.         Gastritis     Gastritis and epigastric pain have all stop now that she is on omeprazole 40 mg daily and Levsin.  She will continue on these drugs for the next 6 months until I see her back at which time we will try to start weaning them.         Relevant Medications    omeprazole (PriLOSEC) 40 mg DR capsule    hyoscyamine (Anaspaz, Levsin) 0.125 mg tablet    Epigastric pain     Other Visit Diagnoses       Annual physical exam    -  Primary    Relevant Orders    Lipid Panel    CBC    Comprehensive Metabolic Panel    TSH with reflex to Free T4 if abnormal    Vitamin D 25-Hydroxy,Total (for eval of Vitamin D levels)    Irritable bowel syndrome, unspecified type        Relevant Medications    hyoscyamine (Anaspaz, Levsin) 0.125 mg tablet                   It has been a pleasure seeing you.  Lori Abdi MD

## 2024-10-18 ENCOUNTER — LAB (OUTPATIENT)
Dept: LAB | Facility: LAB | Age: 34
End: 2024-10-18
Payer: COMMERCIAL

## 2024-11-11 DIAGNOSIS — J06.9 URTI (ACUTE UPPER RESPIRATORY INFECTION): Primary | ICD-10-CM

## 2024-11-11 RX ORDER — ALBUTEROL SULFATE 90 UG/1
2 INHALANT RESPIRATORY (INHALATION) EVERY 4 HOURS PRN
Qty: 8.5 G | Refills: 0 | Status: SHIPPED | OUTPATIENT
Start: 2024-11-11 | End: 2025-11-11